# Patient Record
Sex: FEMALE | Race: WHITE | HISPANIC OR LATINO | Employment: PART TIME | URBAN - NONMETROPOLITAN AREA
[De-identification: names, ages, dates, MRNs, and addresses within clinical notes are randomized per-mention and may not be internally consistent; named-entity substitution may affect disease eponyms.]

---

## 2018-05-06 ENCOUNTER — HOSPITAL ENCOUNTER (EMERGENCY)
Facility: HOSPITAL | Age: 23
Discharge: HOME | End: 2018-05-07
Attending: STUDENT IN AN ORGANIZED HEALTH CARE EDUCATION/TRAINING PROGRAM | Admitting: STUDENT IN AN ORGANIZED HEALTH CARE EDUCATION/TRAINING PROGRAM

## 2018-05-06 DIAGNOSIS — R05.9 COUGH: Primary | ICD-10-CM

## 2018-05-06 PROCEDURE — 99283 EMERGENCY DEPT VISIT LOW MDM: CPT

## 2018-05-06 PROCEDURE — 94799 UNLISTED PULMONARY SVC/PX: CPT

## 2018-05-06 PROCEDURE — 94640 AIRWAY INHALATION TREATMENT: CPT

## 2018-05-06 PROCEDURE — 81025 URINE PREGNANCY TEST: CPT | Performed by: NURSE PRACTITIONER

## 2018-05-06 RX ORDER — ONDANSETRON 4 MG/1
4 TABLET, ORALLY DISINTEGRATING ORAL ONCE
Status: COMPLETED | OUTPATIENT
Start: 2018-05-06 | End: 2018-05-06

## 2018-05-06 RX ORDER — IPRATROPIUM BROMIDE AND ALBUTEROL SULFATE 2.5; .5 MG/3ML; MG/3ML
3 SOLUTION RESPIRATORY (INHALATION) ONCE
Status: COMPLETED | OUTPATIENT
Start: 2018-05-06 | End: 2018-05-06

## 2018-05-06 RX ADMIN — IPRATROPIUM BROMIDE AND ALBUTEROL SULFATE 3 ML: .5; 3 SOLUTION RESPIRATORY (INHALATION) at 23:16

## 2018-05-06 RX ADMIN — ONDANSETRON 4 MG: 4 TABLET, ORALLY DISINTEGRATING ORAL at 23:16

## 2018-05-07 VITALS
RESPIRATION RATE: 18 BRPM | DIASTOLIC BLOOD PRESSURE: 89 MMHG | BODY MASS INDEX: 33.99 KG/M2 | OXYGEN SATURATION: 98 % | HEART RATE: 110 BPM | HEIGHT: 61 IN | TEMPERATURE: 99.2 F | SYSTOLIC BLOOD PRESSURE: 137 MMHG | WEIGHT: 180 LBS

## 2018-05-07 LAB — B-HCG UR QL: NEGATIVE

## 2018-05-07 NOTE — ED PROVIDER NOTES
Subjective   History of Present Illness  This is a 22 year old female who comes in today  complaining of cough and nausea. She reports having a lot of allergies and went to [employer] and when she left she started having a lot of coughing and then coughed so much she had vomiting. She also would like to know if she is pregnant.  Review of Systems   Constitutional: Negative.    HENT: Negative.    Eyes: Negative.    Respiratory: Positive for cough.    Cardiovascular: Negative.    Gastrointestinal: Positive for nausea.   Endocrine: Negative.    Genitourinary: Negative.    Musculoskeletal: Negative.    Skin: Negative.    Allergic/Immunologic: Negative.    Neurological: Negative.    Hematological: Negative.    Psychiatric/Behavioral: Negative.    All other systems reviewed and are negative.      Past Medical History:   Diagnosis Date   • H/O seasonal allergies        No Known Allergies    History reviewed. No pertinent surgical history.    History reviewed. No pertinent family history.    Social History     Social History   • Marital status: Single     Social History Main Topics   • Smoking status: Never Smoker   • Alcohol use Yes      Comment: occasionally   • Drug use: No   • Sexual activity: Defer     Other Topics Concern   • Not on file           Objective   Physical Exam   Constitutional: She appears well-developed and well-nourished.   Nursing note and vitals reviewed.   GEN: No acute distress  Head: Normocephalic, atraumatic  Eyes: Pupils equal round reactive to light  ENT: Posterior pharynx normal in appearance, oral mucosa is moist  Chest: Nontender to palpation  Cardiovascular: Regular rate  Lungs: Scattered wheezes throughout  Abdomen: Soft, nontender, nondistended, no peritoneal signs  Extremities: No edema, normal appearance  Neuro: GCS 15  Psych: Mood and affect are appropriate      Procedures           ED Course  ED Course   Comment By Time   Feeling much better after her neb treatment.  We will continue to  monitor her for sure.  If she is doing okay we will discharge her home to follow up with her primary care. Edwige Martinezhead, APRN 05/07 0003                  MDM  Number of Diagnoses or Management Options  Diagnosis management comments: We will go ahead and give her a neb treatments that she did miss her allergy medicine today.  Her oxygen saturation is 97-99%.  And then we will reevaluate her.       Amount and/or Complexity of Data Reviewed  Clinical lab tests: ordered and reviewed  Review and summarize past medical records: yes  Discuss the patient with other providers: yes    Risk of Complications, Morbidity, and/or Mortality  Presenting problems: low  Diagnostic procedures: low  Management options: low          Final diagnoses:   Cough            Edwige L Maria Teresa, APRN  05/07/18 0028

## 2018-10-09 ENCOUNTER — TRANSCRIBE ORDERS (OUTPATIENT)
Dept: ADMINISTRATIVE | Facility: HOSPITAL | Age: 23
End: 2018-10-09

## 2018-10-09 ENCOUNTER — HOSPITAL ENCOUNTER (OUTPATIENT)
Dept: GENERAL RADIOLOGY | Facility: HOSPITAL | Age: 23
Discharge: HOME | End: 2018-10-09
Admitting: NURSE PRACTITIONER

## 2018-10-09 DIAGNOSIS — IMO0002 LUMP: ICD-10-CM

## 2018-10-09 DIAGNOSIS — M95.4 ACQUIRED DEFORMITY OF CHEST AND RIB: Primary | ICD-10-CM

## 2018-10-09 PROCEDURE — 71101 X-RAY EXAM UNILAT RIBS/CHEST: CPT

## 2019-05-31 ENCOUNTER — PROCEDURE VISIT (OUTPATIENT)
Dept: OBSTETRICS AND GYNECOLOGY | Facility: CLINIC | Age: 24
End: 2019-05-31

## 2019-05-31 VITALS
HEIGHT: 62 IN | BODY MASS INDEX: 32.2 KG/M2 | WEIGHT: 175 LBS | SYSTOLIC BLOOD PRESSURE: 112 MMHG | DIASTOLIC BLOOD PRESSURE: 80 MMHG

## 2019-05-31 DIAGNOSIS — Z30.017 ENCOUNTER FOR INITIAL PRESCRIPTION OF IMPLANTABLE SUBDERMAL CONTRACEPTIVE: ICD-10-CM

## 2019-05-31 DIAGNOSIS — Z12.4 SCREENING FOR MALIGNANT NEOPLASM OF CERVIX: ICD-10-CM

## 2019-05-31 DIAGNOSIS — Z01.419 ENCOUNTER FOR GYNECOLOGICAL EXAMINATION WITHOUT ABNORMAL FINDING: Primary | ICD-10-CM

## 2019-05-31 PROCEDURE — 99385 PREV VISIT NEW AGE 18-39: CPT | Performed by: PHYSICIAN ASSISTANT

## 2019-05-31 RX ORDER — NORGESTIMATE AND ETHINYL ESTRADIOL 7DAYSX3 28
KIT ORAL
COMMUNITY
Start: 2019-05-11 | End: 2020-08-27

## 2019-05-31 NOTE — PROGRESS NOTES
"Subjective   Chief Complaint   Patient presents with   • Gynecologic Exam     Has never had a pap   • Contraception     Would like to discuss birth control options       Chloe Stovall is a 23 y.o. year old new patient  presenting to be seen for her annual gynecological exam.   She has no complaints or concerns  Currently using Tri-sprintec oc's but desires more convenient birth control. Interested in Nexplanon   Cycles regular and Patient's last menstrual period was 05/10/2019.   She would like option of STI screening with pap      Past Medical History:   Diagnosis Date   • H/O seasonal allergies         Current Outpatient Medications:   •  Loratadine (CLARITIN PO), Take  by mouth., Disp: , Rfl:   •  Montelukast Sodium (SINGULAIR PO), Take  by mouth., Disp: , Rfl:   •  TRI-SPRINTEC 0.18/0.215/0.25 MG-35 MCG per tablet, , Disp: , Rfl:    No Known Allergies   Past Surgical History:   Procedure Laterality Date   • NO PAST SURGERIES        Social History     Socioeconomic History   • Marital status: Single     Spouse name: Not on file   • Number of children: Not on file   • Years of education: Not on file   • Highest education level: Not on file   Tobacco Use   • Smoking status: Never Smoker   • Smokeless tobacco: Never Used   Substance and Sexual Activity   • Alcohol use: Yes     Comment: occasionally   • Drug use: No   • Sexual activity: Yes     Partners: Male     Birth control/protection: OCP      Family History   Problem Relation Age of Onset   • No Known Problems Father    • No Known Problems Mother    • Breast cancer Maternal Aunt        Review of Systems   Constitutional: Negative.    Gastrointestinal: Negative.    Genitourinary: Negative.            Objective   /80   Ht 157.5 cm (62\")   Wt 79.4 kg (175 lb)   LMP 05/10/2019   Breastfeeding? No   BMI 32.01 kg/m²     Physical Exam   Constitutional: She appears well-developed and well-nourished. She is cooperative.   Pulmonary/Chest: Right breast " exhibits no inverted nipple, no mass, no nipple discharge, no skin change and no tenderness. Left breast exhibits no inverted nipple, no mass, no nipple discharge, no skin change and no tenderness.   Abdominal: Soft. Normal appearance. There is no tenderness.   Genitourinary: Vagina normal and uterus normal. There is no tenderness or lesion on the right labia. There is no tenderness or lesion on the left labia. Cervix exhibits no motion tenderness and no discharge. Right adnexum displays no mass and no tenderness. Left adnexum displays no mass and no tenderness.   Genitourinary Comments: Pap done   Neurological: She is alert.   Skin: Skin is warm and dry.   Psychiatric: She has a normal mood and affect. Her behavior is normal.            Assessment and Plan  Chloe was seen today for gynecologic exam and contraception.    Diagnoses and all orders for this visit:    Encounter for gynecological examination without abnormal finding    Screening for malignant neoplasm of cervix  -     Liquid-based Pap Smear, Screening; Future    Encounter for initial prescription of implantable subdermal contraceptive      Patient Instructions   Encourage self breast exam monthly  Regular exercise  STI prevention  Call office first day of menses to schedule Nexplanon insertion             This note was electronically signed.    Jessenia Sanchez PA-C   May 31, 2019

## 2019-05-31 NOTE — PATIENT INSTRUCTIONS
Encourage self breast exam monthly  Regular exercise  STI prevention  Call office first day of menses to schedule Nexplanon insertion

## 2019-06-11 DIAGNOSIS — Z12.4 SCREENING FOR MALIGNANT NEOPLASM OF CERVIX: ICD-10-CM

## 2019-06-13 ENCOUNTER — OFFICE VISIT (OUTPATIENT)
Dept: OBSTETRICS AND GYNECOLOGY | Facility: CLINIC | Age: 24
End: 2019-06-13

## 2019-06-13 VITALS
DIASTOLIC BLOOD PRESSURE: 78 MMHG | HEIGHT: 62 IN | BODY MASS INDEX: 32.2 KG/M2 | SYSTOLIC BLOOD PRESSURE: 118 MMHG | WEIGHT: 175 LBS

## 2019-06-13 DIAGNOSIS — Z30.017 NEXPLANON INSERTION: Primary | ICD-10-CM

## 2019-06-13 LAB
B-HCG UR QL: NEGATIVE
INTERNAL NEGATIVE CONTROL: NEGATIVE
INTERNAL POSITIVE CONTROL: POSITIVE
Lab: NORMAL

## 2019-06-13 PROCEDURE — 81025 URINE PREGNANCY TEST: CPT | Performed by: PHYSICIAN ASSISTANT

## 2019-06-13 PROCEDURE — 11981 INSERTION DRUG DLVR IMPLANT: CPT | Performed by: PHYSICIAN ASSISTANT

## 2019-06-13 NOTE — PROGRESS NOTES
Nexplanon Insertion    Patient's last menstrual period was 06/10/2019.    Date of procedure:  6/13/2019    Risks and benefits discussed? yes  All questions answered? yes  Consents given by the patient  Written consent obtained? yes    Local anesthesia used:  yes - 1.5 cc's of  Meds; anesthesia local: 1% lidocaine with epinephrine    Procedure documentation:    The upper left arm was marked at the intended site of insertion.  Betadine was used to cleanse the skin.  Local anesthesia was injected.  The Nexplanon was placed subdermally without difficulty.  The devise was able to be palpated in the arm by both myself and Shyana.  Steri-strips were then placed across the site of insertion and the arm was wrapped.    She tolerated the procedure well.  There were no complications.  EBL was minimal.    Post procedure instructions: Remove the wrapping in 24 hours and the steri-strips in 5 days.    Follow up needed: PRN    This note was electronically signed.    Jessenia Sanchez PA-C  June 13, 2019

## 2019-10-17 ENCOUNTER — HOSPITAL ENCOUNTER (EMERGENCY)
Facility: HOSPITAL | Age: 24
Discharge: HOME | End: 2019-10-17
Attending: STUDENT IN AN ORGANIZED HEALTH CARE EDUCATION/TRAINING PROGRAM | Admitting: STUDENT IN AN ORGANIZED HEALTH CARE EDUCATION/TRAINING PROGRAM

## 2019-10-17 VITALS
SYSTOLIC BLOOD PRESSURE: 136 MMHG | HEIGHT: 61 IN | WEIGHT: 181.6 LBS | TEMPERATURE: 97.8 F | DIASTOLIC BLOOD PRESSURE: 97 MMHG | HEART RATE: 82 BPM | BODY MASS INDEX: 34.29 KG/M2 | OXYGEN SATURATION: 100 % | RESPIRATION RATE: 18 BRPM

## 2019-10-17 DIAGNOSIS — L50.9 HIVES: Primary | ICD-10-CM

## 2019-10-17 PROCEDURE — 99283 EMERGENCY DEPT VISIT LOW MDM: CPT

## 2019-10-17 PROCEDURE — 25010000002 DEXAMETHASONE PER 1 MG: Performed by: STUDENT IN AN ORGANIZED HEALTH CARE EDUCATION/TRAINING PROGRAM

## 2019-10-17 RX ADMIN — DEXAMETHASONE SODIUM PHOSPHATE 10 MG: 10 INJECTION INTRAMUSCULAR; INTRAVENOUS at 03:55

## 2019-10-17 NOTE — ED PROVIDER NOTES
Subjective   Patient is a 23-year-old female who presents to the emergency department with concerns for hives.  Patient states she has had hives since approximately 2 days ago when heard her significant other were on vacation in [place] and a wine tasting.  She states they do seem to become worse at night but that she seems to be itching most of the time.  Patient was talking to her mother they agreed that an allergy referral would be the right thing to do.  States the rash is very itchy and that nothing makes it better or worse.  She has tried Benadryl.            Review of Systems   All other systems reviewed and are negative.      Past Medical History:   Diagnosis Date   • H/O seasonal allergies        No Known Allergies    Past Surgical History:   Procedure Laterality Date   • NO PAST SURGERIES         Family History   Problem Relation Age of Onset   • No Known Problems Father    • No Known Problems Mother    • Breast cancer Maternal Aunt        Social History     Socioeconomic History   • Marital status: Single     Spouse name: Not on file   • Number of children: Not on file   • Years of education: Not on file   • Highest education level: Not on file   Tobacco Use   • Smoking status: Never Smoker   • Smokeless tobacco: Never Used   Substance and Sexual Activity   • Alcohol use: Yes     Comment: occasionally   • Drug use: No   • Sexual activity: Yes     Partners: Male     Birth control/protection: Implant           Objective   Physical Exam   Nursing note and vitals reviewed.    GEN: No acute distress  Skin: Urticarial rash on bilateral upper extremities  Head: Normocephalic, atraumatic  Eyes: Pupils equal round reactive to light  ENT: Posterior pharynx normal in appearance, oral mucosa is moist  Chest: Nontender to palpation  Cardiovascular: Regular rate  Lungs: Clear to auscultation bilaterally  Abdomen: Soft, nontender, nondistended, no peritoneal signs  Extremities: No edema, normal appearance  Neuro: GCS  15  Psych: Mood and affect are appropriate      Procedures           ED Course                  MDM  Number of Diagnoses or Management Options  Hives:   Diagnosis management comments: Unsure what would be causing the patient's symptoms.  We will give her steroids here to help give her referral to an allergist.       Amount and/or Complexity of Data Reviewed  Decide to obtain previous medical records or to obtain history from someone other than the patient: yes  Obtain history from someone other than the patient: yes        Final diagnoses:   Hives              Jerrell Sandoval MD  10/17/19 0438

## 2020-07-21 ENCOUNTER — TELEPHONE (OUTPATIENT)
Dept: INTERNAL MEDICINE | Facility: CLINIC | Age: 25
End: 2020-07-21

## 2020-07-21 NOTE — TELEPHONE ENCOUNTER
Patient is calling to establish care and has had a cough and covid testing which came back inconclusive.  Patient is having another test done at urgent care. Patient has MYCHART video visit on 07/24/20  At 1045 with Maryann Couch to discuss anxiety and establish primary care.  Please advise    If other scheduling needs are requested, patient contact is   [phone]

## 2020-08-27 ENCOUNTER — OFFICE VISIT (OUTPATIENT)
Dept: OBSTETRICS AND GYNECOLOGY | Facility: CLINIC | Age: 25
End: 2020-08-27

## 2020-08-27 VITALS
WEIGHT: 178.4 LBS | BODY MASS INDEX: 33.68 KG/M2 | SYSTOLIC BLOOD PRESSURE: 116 MMHG | HEIGHT: 61 IN | DIASTOLIC BLOOD PRESSURE: 74 MMHG

## 2020-08-27 DIAGNOSIS — Z01.419 ENCOUNTER FOR GYNECOLOGICAL EXAMINATION WITHOUT ABNORMAL FINDING: Primary | ICD-10-CM

## 2020-08-27 DIAGNOSIS — Z12.4 SCREENING FOR CERVICAL CANCER: ICD-10-CM

## 2020-08-27 PROCEDURE — 99395 PREV VISIT EST AGE 18-39: CPT | Performed by: PHYSICIAN ASSISTANT

## 2020-08-27 NOTE — PROGRESS NOTES
"Subjective   Chief Complaint   Patient presents with   • Gynecologic Exam     Last pap done 6/3/19-WNL, No complaints       Chloe Stovall is a 24 y.o. year old  presenting to be seen for her annual gynecological exam.   She has no complaints or concerns.  She has Nexplanon for birth control which was inserted 2019.  She has very light random bleeds with Nexplanon.  She is engaged and declines screening for STIs.    Past Medical History:   Diagnosis Date   • H/O seasonal allergies         Current Outpatient Medications:   •  Loratadine (CLARITIN PO), Take  by mouth., Disp: , Rfl:   •  Montelukast Sodium (SINGULAIR PO), Take  by mouth., Disp: , Rfl:    No Known Allergies   Past Surgical History:   Procedure Laterality Date   • NO PAST SURGERIES        Social History     Socioeconomic History   • Marital status: Single     Spouse name: Not on file   • Number of children: Not on file   • Years of education: Not on file   • Highest education level: Not on file   Tobacco Use   • Smoking status: Never Smoker   • Smokeless tobacco: Never Used   Substance and Sexual Activity   • Alcohol use: Yes     Comment: occasionally   • Drug use: No   • Sexual activity: Yes     Partners: Male     Birth control/protection: Implant      Family History   Problem Relation Age of Onset   • No Known Problems Father    • No Known Problems Mother    • Breast cancer Maternal Aunt        Review of Systems   Constitutional: Negative for chills, diaphoresis and fever.   Gastrointestinal: Negative for diarrhea, nausea and vomiting.   Genitourinary: Negative for difficulty urinating, dysuria, menstrual problem, pelvic pain and vaginal discharge.   Musculoskeletal: Negative.    All other systems reviewed and are negative.          Objective   /74   Ht 154.9 cm (61\")   Wt 80.9 kg (178 lb 6.4 oz)   Breastfeeding No   BMI 33.71 kg/m²     Physical Exam   Constitutional: She appears well-developed and well-nourished. She is " cooperative. No distress.   Eyes: Conjunctivae, EOM and lids are normal.   Pulmonary/Chest: Right breast exhibits no inverted nipple, no mass, no nipple discharge, no skin change and no tenderness. Left breast exhibits no inverted nipple, no mass, no nipple discharge, no skin change and no tenderness.   Abdominal: Soft. Normal appearance. There is no tenderness. There is no rigidity and no guarding.   Genitourinary: Uterus normal. There is no tenderness or lesion on the right labia. There is no tenderness or lesion on the left labia. Cervix exhibits no motion tenderness, no discharge and no friability. Right adnexum displays no mass and no tenderness. Left adnexum displays no mass and no tenderness. No erythema or tenderness in the vagina. No vaginal discharge found.   Genitourinary Comments: Pap done   Musculoskeletal: Normal range of motion.   Neurological: She is alert.   Skin: Skin is warm and dry. No lesion and no rash noted.   Psychiatric: She has a normal mood and affect. Her behavior is normal. Thought content normal.            Assessment and Plan  Chloe was seen today for gynecologic exam.    Diagnoses and all orders for this visit:    Encounter for gynecological examination without abnormal finding    Screening for cervical cancer  -     Liquid-based Pap Smear, Screening; Future      Patient Instructions   Encourage self breast exam monthly  Regular exercise             This note was electronically signed.    Jessenia Sanchez PA-C   August 27, 2020

## 2020-09-08 DIAGNOSIS — Z12.4 SCREENING FOR CERVICAL CANCER: ICD-10-CM

## 2020-09-10 ENCOUNTER — OFFICE VISIT (OUTPATIENT)
Dept: INTERNAL MEDICINE | Facility: CLINIC | Age: 25
End: 2020-09-10

## 2020-09-10 VITALS
TEMPERATURE: 97.1 F | WEIGHT: 178 LBS | OXYGEN SATURATION: 99 % | DIASTOLIC BLOOD PRESSURE: 70 MMHG | HEIGHT: 61 IN | BODY MASS INDEX: 33.61 KG/M2 | SYSTOLIC BLOOD PRESSURE: 118 MMHG | HEART RATE: 79 BPM

## 2020-09-10 DIAGNOSIS — F41.1 GENERALIZED ANXIETY DISORDER: ICD-10-CM

## 2020-09-10 DIAGNOSIS — Z23 NEED FOR INFLUENZA VACCINATION: ICD-10-CM

## 2020-09-10 DIAGNOSIS — Z91.09 ENVIRONMENTAL ALLERGIES: ICD-10-CM

## 2020-09-10 DIAGNOSIS — Z76.89 ENCOUNTER TO ESTABLISH CARE: Primary | ICD-10-CM

## 2020-09-10 DIAGNOSIS — F32.A MILD DEPRESSIVE DISORDER: ICD-10-CM

## 2020-09-10 PROCEDURE — 90471 IMMUNIZATION ADMIN: CPT | Performed by: PHYSICIAN ASSISTANT

## 2020-09-10 PROCEDURE — 99214 OFFICE O/P EST MOD 30 MIN: CPT | Performed by: PHYSICIAN ASSISTANT

## 2020-09-10 PROCEDURE — 90686 IIV4 VACC NO PRSV 0.5 ML IM: CPT | Performed by: PHYSICIAN ASSISTANT

## 2020-09-10 RX ORDER — MONTELUKAST SODIUM 10 MG/1
10 TABLET ORAL NIGHTLY
Qty: 30 TABLET | Refills: 11 | Status: SHIPPED | OUTPATIENT
Start: 2020-09-10 | End: 2021-10-05

## 2020-09-10 RX ORDER — HYDROXYZINE HYDROCHLORIDE 25 MG/1
25 TABLET, FILM COATED ORAL 3 TIMES DAILY PRN
COMMUNITY
End: 2020-09-10 | Stop reason: ALTCHOICE

## 2020-09-10 RX ORDER — BUPROPION HYDROCHLORIDE 300 MG/1
300 TABLET ORAL DAILY
Qty: 30 TABLET | Refills: 5 | Status: SHIPPED | OUTPATIENT
Start: 2020-09-10 | End: 2021-03-12 | Stop reason: SDUPTHER

## 2020-09-10 RX ORDER — BUPROPION HYDROCHLORIDE 100 MG/1
TABLET ORAL
COMMUNITY
Start: 2020-06-01 | End: 2020-09-10 | Stop reason: DRUGHIGH

## 2020-09-10 RX ORDER — BUSPIRONE HYDROCHLORIDE 10 MG/1
10 TABLET ORAL 3 TIMES DAILY PRN
Qty: 90 TABLET | Refills: 5 | Status: SHIPPED | OUTPATIENT
Start: 2020-09-10

## 2020-09-10 RX ORDER — LORATADINE 10 MG/1
10 TABLET ORAL DAILY
Qty: 30 TABLET | Refills: 11 | Status: SHIPPED | OUTPATIENT
Start: 2020-09-10 | End: 2021-10-05

## 2020-09-10 NOTE — PROGRESS NOTES
Subjective   Chloe Stovall is a 24 y.o. female who presents to [facility].    Chief Complaint   Patient presents with   • [facility]     discuss anxiety med, stomach issue       HPI: Diagnosed with mild depressive disorder and severe anxiety by her former PCP at [facility]. She has been taking Wellbutrin 150 mg once daily for the last few months which helps some but not enough.  She rarely takes hydroxyzine as cannot take it during the day due to side effect of drowsiness. No SI or HI. Sleep disturbances improved with Wellbutrin.     She previously experienced a few months of diarrhea and lower abdominal pain around 30 minutes after eating anything.  Symptoms seem to resolve with use of daily probiotics.  She now denies any diarrhea, abdominal pain, constipation, melena, BRBPR, nausea or vomiting.         The following portions of the patient's history were reviewed and updated as appropriate: She  has a past medical history of Depression and H/O seasonal allergies.  She does not have any pertinent problems on file.  She  has a past surgical history that includes Tonsillectomy (Bilateral, 11/2019).  Her family history includes Breast cancer in her maternal aunt; No Known Problems in her father and mother.  She  reports that she has never smoked. She has never used smokeless tobacco. She reports that she drinks alcohol. She reports that she does not use drugs.    Current Outpatient Medications   Medication Sig Dispense Refill   • Etonogestrel (Nexplanon) 68 MG implant subdermal implant      • Lactobacillus (PROBIOTIC ACIDOPHILUS PO) Take  by mouth.     • buPROPion XL (WELLBUTRIN XL) 300 MG 24 hr tablet Take 1 tablet by mouth Daily. 30 tablet 5   • busPIRone (BUSPAR) 10 MG tablet Take 1 tablet by mouth 3 (Three) Times a Day As Needed (anxiety). 90 tablet 5   • loratadine (Claritin) 10 MG tablet Take 1 tablet by mouth Daily. 30 tablet 11   • montelukast (Singulair) 10 MG tablet Take 1 tablet by  mouth Every Night. 30 tablet 11     No current facility-administered medications for this visit.        Review of Systems  Review of Systems   Constitutional: Negative for activity change, appetite change, chills, diaphoresis, fatigue, fever, unexpected weight gain and unexpected weight loss.   HENT: Negative for congestion, dental problem, ear pain, mouth sores, postnasal drip, rhinorrhea, sinus pressure, sneezing, sore throat, swollen glands, trouble swallowing and voice change.    Eyes: Negative for blurred vision, double vision, pain, discharge, redness, itching and visual disturbance.   Respiratory: Negative for cough, choking, chest tightness, shortness of breath and wheezing.    Cardiovascular: Negative for chest pain, palpitations and leg swelling.   Gastrointestinal: Positive for abdominal pain (resolved) and diarrhea (resolved). Negative for abdominal distention, blood in stool, constipation, nausea, rectal pain, vomiting, GERD and indigestion.   Endocrine: Negative for cold intolerance, heat intolerance, polydipsia, polyphagia and polyuria.   Genitourinary: Negative for breast discharge, breast lump, breast pain, decreased libido, decreased urine volume, difficulty urinating, dysuria, flank pain, frequency, hematuria, pelvic pain, urgency, vaginal bleeding and vaginal pain.   Musculoskeletal: Negative for arthralgias, back pain, gait problem, myalgias and neck pain.   Skin: Negative for color change, rash and skin lesions.   Allergic/Immunologic: Positive for environmental allergies. Negative for immunocompromised state.   Neurological: Negative for dizziness, tremors, facial asymmetry, speech difficulty, weakness, light-headedness, numbness, headache, memory problem and confusion.   Hematological: Negative for adenopathy. Does not bruise/bleed easily.   Psychiatric/Behavioral: Positive for dysphoric mood (stable) and sleep disturbance (improved). Negative for agitation, behavioral problems, decreased  "concentration, hallucinations, self-injury, suicidal ideas, negative for hyperactivity, depressed mood and stress. The patient is nervous/anxious.          Objective    /70   Pulse 79   Temp 97.1 °F (36.2 °C)   Ht 154.9 cm (61\")   Wt 80.7 kg (178 lb)   SpO2 99%   BMI 33.63 kg/m²   Physical Exam   Constitutional: She is oriented to person, place, and time. She appears well-developed and well-nourished. No distress.   Mild obesity.    HENT:   Head: Normocephalic and atraumatic.   Eyes: Pupils are equal, round, and reactive to light. Conjunctivae and EOM are normal.   Neck: Normal range of motion. Neck supple. No thyromegaly present.   Cardiovascular: Normal rate, regular rhythm and normal heart sounds. Exam reveals no gallop and no friction rub.   No murmur heard.  Pulmonary/Chest: Effort normal and breath sounds normal. No respiratory distress. She has no wheezes. She has no rales. She exhibits no tenderness.   Abdominal: Soft. Bowel sounds are normal. She exhibits no distension and no mass. There is no tenderness. There is no rebound and no guarding. No hernia.   Musculoskeletal: Normal range of motion. She exhibits no edema, tenderness or deformity.   Lymphadenopathy:     She has no cervical adenopathy.   Neurological: She is alert and oriented to person, place, and time. She displays normal reflexes. No cranial nerve deficit or sensory deficit. She exhibits normal muscle tone. Coordination normal.   Skin: Skin is warm and dry. Capillary refill takes less than 2 seconds. No rash noted. She is not diaphoretic.   Psychiatric: She has a normal mood and affect. Her behavior is normal. Judgment and thought content normal.   Nursing note and vitals reviewed.           PHQ-9 Depression Screening  Little interest or pleasure in doing things? 0   Feeling down, depressed, or hopeless? 0   Trouble falling or staying asleep, or sleeping too much?     Feeling tired or having little energy?     Poor appetite or " overeating?     Feeling bad about yourself - or that you are a failure or have let yourself or your family down?     Trouble concentrating on things, such as reading the newspaper or watching television?     Moving or speaking so slowly that other people could have noticed? Or the opposite - being so fidgety or restless that you have been moving around a lot more than usual?     Thoughts that you would be better off dead, or of hurting yourself in some way?     PHQ-9 Total Score 0   If you checked off any problems, how difficult have these problems made it for you to do your work, take care of things at home, or get along with other people?           Assessment/Plan      Diagnosis Plan   1. Encounter to establish care     2. Need for influenza vaccination  Fluarix Quad >6 Months ([id number])     3. Mild depressive disorder (CMS/HCC)  Dose increase: buPROPion XL (WELLBUTRIN XL) 300 MG 24 hr tablet   4. Generalized anxiety disorder  Dose increase: buPROPion XL (WELLBUTRIN XL) 300 MG 24 hr tablet      Begin tid prn: busPIRone (BUSPAR) 10 MG tablet    Risks, benefits and potential side effects of medication reviewed and patient agrees to use.      5. Environmental allergies  Continue: loratadine (Claritin) 10 MG tablet    Continue: montelukast (Singulair) 10 MG tablet           Return in about 6 months (around 3/10/2021) for Annual physical.             ROLANDO Pizarro  09/10/2020  9:20 AM

## 2020-10-26 ENCOUNTER — TELEPHONE (OUTPATIENT)
Dept: OBSTETRICS AND GYNECOLOGY | Facility: CLINIC | Age: 25
End: 2020-10-26

## 2020-10-26 RX ORDER — NORETHINDRONE ACETATE AND ETHINYL ESTRADIOL 1MG-20(21)
1 KIT ORAL DAILY
Qty: 28 TABLET | Refills: 2 | Status: SHIPPED | OUTPATIENT
Start: 2020-10-26 | End: 2021-03-12

## 2020-10-26 NOTE — TELEPHONE ENCOUNTER
----- Message from Chloe Stovall sent at 10/23/2020 10:45 PM EDT -----  Regarding: Non-Urgent Medical Question  Contact: [phone]  So I have had Nexplanon for over a year now and I had two times where I have had some spotting, however since my last dr visit with you in September, I have had a period off and on. Like it lasts over a week and then I have a few days where there no bleeding, then it repeats. I'm on my third round of periods now and I'm just wondering if this is something I need to be concerned about. I know nexplanon does that to women's periods and then they eventually don't have them anymore. But mine is doing the complete opposite of that.   Hope to hear back soon.   Thanks,   Chloe Stovall   [phone]

## 2020-10-26 NOTE — TELEPHONE ENCOUNTER
Flori,    I called and spoke with patient, discussed maybe starting on a birth control pill to help with bleeding.

## 2020-10-26 NOTE — TELEPHONE ENCOUNTER
If patient is not having any other problems such as pelvic pain, discharge, symptoms of vaginal infection then she may try junel fe 1/20 ocp for 3 months. Must take ocp consistently daily

## 2020-10-26 NOTE — TELEPHONE ENCOUNTER
Pt is not having any syptoms of  Vaginal infection, pelvic pain.     Rx is pending for you, just need to be approved

## 2021-03-12 ENCOUNTER — OFFICE VISIT (OUTPATIENT)
Dept: INTERNAL MEDICINE | Facility: CLINIC | Age: 26
End: 2021-03-12

## 2021-03-12 VITALS
BODY MASS INDEX: 33.23 KG/M2 | WEIGHT: 176 LBS | DIASTOLIC BLOOD PRESSURE: 68 MMHG | SYSTOLIC BLOOD PRESSURE: 122 MMHG | TEMPERATURE: 97.4 F | HEIGHT: 61 IN | OXYGEN SATURATION: 100 % | HEART RATE: 93 BPM

## 2021-03-12 DIAGNOSIS — Z00.00 ANNUAL PHYSICAL EXAM: Primary | ICD-10-CM

## 2021-03-12 DIAGNOSIS — E66.09 CLASS 1 OBESITY DUE TO EXCESS CALORIES WITHOUT SERIOUS COMORBIDITY WITH BODY MASS INDEX (BMI) OF 33.0 TO 33.9 IN ADULT: ICD-10-CM

## 2021-03-12 DIAGNOSIS — F41.1 GENERALIZED ANXIETY DISORDER: ICD-10-CM

## 2021-03-12 DIAGNOSIS — F32.A MILD DEPRESSIVE DISORDER: ICD-10-CM

## 2021-03-12 PROCEDURE — 99395 PREV VISIT EST AGE 18-39: CPT | Performed by: PHYSICIAN ASSISTANT

## 2021-03-12 RX ORDER — BUPROPION HYDROCHLORIDE 300 MG/1
300 TABLET ORAL DAILY
Qty: 90 TABLET | Refills: 3 | Status: SHIPPED | OUTPATIENT
Start: 2021-03-12 | End: 2022-03-05

## 2021-03-12 NOTE — PROGRESS NOTES
Subjective   Chloe Stovall is a 25 y.o. female and is here for a comprehensive physical exam. The patient reports no new problems.    HPI: she has been doing well since Wellbutrin dose increase. She rarely needs to take Buspar.       PHQ-2 Depression Screening  Little interest or pleasure in doing things? 0   Feeling down, depressed, or hopeless? 0   PHQ-2 Total Score 0         Health Habits:  Eye exam within last 2 years? Yes   Dental exam every 6 months? Yes   Exercise habits: was exercising but not in recent months  Healthy diet? Yes     The ASCVD Risk score (Lorena WILLS Jr., et al., 2013) failed to calculate for the following reasons:    The 2013 ASCVD risk score is only valid for ages 40 to 79        Do you take any herbs or supplements that were not prescribed by a doctor? yes , probiotic  Are you taking calcium supplements? No  Are you taking aspirin daily? No     History:  LMP: No LMP recorded. Patient has had an implant.  Menopause: No  Last pap date: 20  Abnormal pap? no  Family history of breast or ovarian cancer: yes, maternal aunt         OB History    Para Term  AB Living   0 0 0 0 0 0   SAB TAB Ectopic Molar Multiple Live Births   0 0 0 0 0 0      reports being sexually active and has had partner(s) who are Male. She reports using the following method of birth control/protection: Implant.        The following portions of the patient's history were reviewed and updated as appropriate: She  has a past medical history of Depression and H/O seasonal allergies.  She does not have any pertinent problems on file.  She  has a past surgical history that includes Tonsillectomy (Bilateral, 2019).  Her family history includes Breast cancer in her maternal aunt; No Known Problems in her father and mother.  She  reports that she has never smoked. She has never used smokeless tobacco. She reports current alcohol use. She reports that she does not use drugs.  Current Outpatient Medications    Medication Sig Dispense Refill   • buPROPion XL (WELLBUTRIN XL) 300 MG 24 hr tablet Take 1 tablet by mouth Daily. 90 tablet 3   • busPIRone (BUSPAR) 10 MG tablet Take 1 tablet by mouth 3 (Three) Times a Day As Needed (anxiety). 90 tablet 5   • Etonogestrel (Nexplanon) 68 MG implant subdermal implant      • Lactobacillus (PROBIOTIC ACIDOPHILUS PO) Take  by mouth.     • loratadine (Claritin) 10 MG tablet Take 1 tablet by mouth Daily. 30 tablet 11   • montelukast (Singulair) 10 MG tablet Take 1 tablet by mouth Every Night. 30 tablet 11     No current facility-administered medications for this visit.       Review of Systems  Do you have pain that bothers you in your daily life? no    Review of Systems   Constitutional: Negative for activity change, appetite change, chills, diaphoresis, fatigue, fever, unexpected weight gain and unexpected weight loss.   HENT: Negative for congestion, dental problem, ear pain, mouth sores, postnasal drip, rhinorrhea, sinus pressure, sneezing, sore throat, swollen glands, trouble swallowing and voice change.    Eyes: Negative for blurred vision, double vision, pain, discharge, redness, itching and visual disturbance.   Respiratory: Negative for cough, choking, chest tightness, shortness of breath and wheezing.    Cardiovascular: Negative for chest pain, palpitations and leg swelling.   Gastrointestinal: Negative for abdominal distention, abdominal pain, blood in stool, constipation, diarrhea, nausea, rectal pain, vomiting, GERD and indigestion.   Endocrine: Negative for cold intolerance, heat intolerance, polydipsia, polyphagia and polyuria.   Genitourinary: Negative for breast discharge, breast lump, breast pain, decreased libido, decreased urine volume, difficulty urinating, dysuria, flank pain, frequency, hematuria, pelvic pain, urgency, vaginal bleeding and vaginal pain.   Musculoskeletal: Positive for back pain (minimal). Negative for arthralgias, gait problem, myalgias and neck  "pain.   Skin: Negative for color change, rash and skin lesions.   Allergic/Immunologic: Positive for environmental allergies (stable). Negative for immunocompromised state.   Neurological: Negative for dizziness, tremors, facial asymmetry, speech difficulty, weakness, light-headedness, numbness, headache, memory problem and confusion.   Hematological: Negative for adenopathy. Does not bruise/bleed easily.   Psychiatric/Behavioral: Positive for depressed mood (stable). Negative for agitation, behavioral problems, decreased concentration, hallucinations, self-injury, sleep disturbance, suicidal ideas, negative for hyperactivity and stress. The patient is nervous/anxious (stable).          Objective   /68   Pulse 93   Temp 97.4 °F (36.3 °C)   Ht 154.9 cm (60.98\")   Wt 79.8 kg (176 lb)   SpO2 100%   BMI 33.27 kg/m²     Physical Exam  Vitals and nursing note reviewed.   Constitutional:       General: She is not in acute distress.     Appearance: She is well-developed. She is obese. She is not ill-appearing, toxic-appearing or diaphoretic.   HENT:      Head: Normocephalic and atraumatic.      Right Ear: Ear canal and external ear normal. There is no impacted cerumen.      Left Ear: Ear canal and external ear normal. There is no impacted cerumen.      Ears:      Comments: Mild bilateral TM effusions.  Eyes:      General: No scleral icterus.        Right eye: No discharge.         Left eye: No discharge.      Extraocular Movements: Extraocular movements intact.      Conjunctiva/sclera: Conjunctivae normal.      Pupils: Pupils are equal, round, and reactive to light.   Neck:      Thyroid: No thyromegaly.   Cardiovascular:      Rate and Rhythm: Normal rate and regular rhythm.      Heart sounds: Normal heart sounds. No murmur. No friction rub. No gallop.    Pulmonary:      Effort: Pulmonary effort is normal. No respiratory distress.      Breath sounds: Normal breath sounds. No wheezing or rales.   Chest:      Chest " wall: No tenderness.   Abdominal:      General: Bowel sounds are normal. There is no distension.      Palpations: Abdomen is soft. There is no mass.      Tenderness: There is no abdominal tenderness. There is no right CVA tenderness, left CVA tenderness or rebound.   Musculoskeletal:         General: No tenderness or deformity. Normal range of motion.      Cervical back: Normal range of motion and neck supple. No rigidity or tenderness.      Right lower leg: No edema.      Left lower leg: No edema.   Lymphadenopathy:      Cervical: No cervical adenopathy.   Skin:     General: Skin is warm and dry.      Capillary Refill: Capillary refill takes less than 2 seconds.      Coloration: Skin is not pale.      Findings: No rash.   Neurological:      Mental Status: She is alert and oriented to person, place, and time.      Cranial Nerves: No cranial nerve deficit.      Sensory: No sensory deficit.      Gait: Gait normal.      Deep Tendon Reflexes: Reflexes normal.   Psychiatric:         Mood and Affect: Mood normal.         Behavior: Behavior normal.         Thought Content: Thought content normal.         Judgment: Judgment normal.            Assessment/Plan   Healthy female exam.     1.    Diagnosis Plan   1. Annual physical exam     2. Class 1 obesity due to excess calories without serious comorbidity with body mass index (BMI) of 33.0 to 33.9 in adult  Patient's Body mass index is 33.27 kg/m². BMI is above normal parameters. Recommendations include: exercise counseling and nutrition counseling.     3. Mild depressive disorder (CMS/HCC)  Well controlled, continue: buPROPion XL (WELLBUTRIN XL) 300 MG 24 hr tablet   4. Generalized anxiety disorder  Well controlled, continue: buPROPion XL (WELLBUTRIN XL) 300 MG 24 hr tablet    Continue Buspar 10 mg tid prn.         2. Patient Counseling:  --Nutrition: Stressed importance of moderation in sodium/caffeine intake, saturated fat and cholesterol, caloric balance, sufficient  intake of fresh fruits, vegetables, fiber, calcium, iron, and 1 g folate supplementation if of childbearing age.   --Discussed the issue of calcium supplement, and the daily use of baby aspirin if applicable.             --Mammogram recommended every 2 years from age 40-49 and yearly beginning at age 50.  --Exercise: Stressed the importance of regular exercise.   --Substance Abuse: Discussed cessation/primary prevention of tobacco (if applicable), alcohol, or other drug use (if applicable); driving or other dangerous activities under the influence; availability of treatment for abuse.    --Sexuality: Discussed sexually transmitted diseases, partner selection, use of condoms, avoidance of unintended pregnancy  and contraceptive alternatives.   --Injury prevention: Discussed safety belts, safety helmets, smoke detector, smoking near bedding or upholstery.   --Dental health: Discussed importance of regular tooth brushing, flossing, and dental visits every 6 months.  --Immunizations reviewed.  --Discussed benefits of screening colonoscopy (if applicable).  --After hours service discussed with patient.    3. Discussed the patient's BMI with her.  The BMI is above average; BMI management plan is completed  4. Return in about 1 year (around 3/12/2022) for Annual physical.         ROLANDO Pizarro  03/12/2021  09:05 EST

## 2021-03-30 ENCOUNTER — IMMUNIZATION (OUTPATIENT)
Dept: VACCINE CLINIC | Facility: HOSPITAL | Age: 26
End: 2021-03-30

## 2021-03-30 PROCEDURE — 91300 HC SARSCOV02 VAC 30MCG/0.3ML IM: CPT | Performed by: INTERNAL MEDICINE

## 2021-03-30 PROCEDURE — 0001A: CPT | Performed by: INTERNAL MEDICINE

## 2021-04-21 ENCOUNTER — IMMUNIZATION (OUTPATIENT)
Dept: VACCINE CLINIC | Facility: HOSPITAL | Age: 26
End: 2021-04-21

## 2021-04-21 PROCEDURE — 91300 HC SARSCOV02 VAC 30MCG/0.3ML IM: CPT | Performed by: INTERNAL MEDICINE

## 2021-04-21 PROCEDURE — 0002A: CPT | Performed by: INTERNAL MEDICINE

## 2021-09-09 ENCOUNTER — OFFICE VISIT (OUTPATIENT)
Dept: OBSTETRICS AND GYNECOLOGY | Facility: CLINIC | Age: 26
End: 2021-09-09

## 2021-09-09 VITALS
WEIGHT: 179.6 LBS | BODY MASS INDEX: 35.26 KG/M2 | DIASTOLIC BLOOD PRESSURE: 62 MMHG | SYSTOLIC BLOOD PRESSURE: 100 MMHG | HEIGHT: 60 IN

## 2021-09-09 DIAGNOSIS — Z01.419 ENCOUNTER FOR GYNECOLOGICAL EXAMINATION WITHOUT ABNORMAL FINDING: Primary | ICD-10-CM

## 2021-09-09 DIAGNOSIS — Z11.3 ROUTINE SCREENING FOR STI (SEXUALLY TRANSMITTED INFECTION): ICD-10-CM

## 2021-09-09 DIAGNOSIS — Z12.4 SCREENING FOR CERVICAL CANCER: ICD-10-CM

## 2021-09-09 PROCEDURE — 99395 PREV VISIT EST AGE 18-39: CPT | Performed by: PHYSICIAN ASSISTANT

## 2021-09-09 NOTE — PROGRESS NOTES
Subjective   Chief Complaint   Patient presents with   • Gynecologic Exam     annual, no complaints       Chloe Stovall is a 25 y.o. year old  presenting to be seen for her annual gynecological exam.   She has no complaints or concerns.  She has Nexplanon for birth control and has had some random light bleeding with Nexplanon but has not had any bleeding for about the past month.  She would like Chlamydia gonorrhea screening with her Pap smear today.  Sexually active and monogamous with male partner.    Past Medical History:   Diagnosis Date   • Depression    • H/O seasonal allergies         Current Outpatient Medications:   •  buPROPion XL (WELLBUTRIN XL) 300 MG 24 hr tablet, Take 1 tablet by mouth Daily., Disp: 90 tablet, Rfl: 3  •  busPIRone (BUSPAR) 10 MG tablet, Take 1 tablet by mouth 3 (Three) Times a Day As Needed (anxiety)., Disp: 90 tablet, Rfl: 5  •  Etonogestrel (Nexplanon) 68 MG implant subdermal implant, , Disp: , Rfl:   •  Lactobacillus (PROBIOTIC ACIDOPHILUS PO), Take  by mouth., Disp: , Rfl:   •  loratadine (Claritin) 10 MG tablet, Take 1 tablet by mouth Daily., Disp: 30 tablet, Rfl: 11  •  montelukast (Singulair) 10 MG tablet, Take 1 tablet by mouth Every Night., Disp: 30 tablet, Rfl: 11   No Known Allergies   Past Surgical History:   Procedure Laterality Date   • TONSILLECTOMY Bilateral 2019      Social History     Socioeconomic History   • Marital status: Single     Spouse name: Not on file   • Number of children: Not on file   • Years of education: Not on file   • Highest education level: Not on file   Tobacco Use   • Smoking status: Never Smoker   • Smokeless tobacco: Never Used   Substance and Sexual Activity   • Alcohol use: Yes     Comment: occasionally 1-2 drinks   • Drug use: No   • Sexual activity: Yes     Partners: Male     Birth control/protection: Implant      Family History   Problem Relation Age of Onset   • No Known Problems Father    • No Known Problems Mother    •  "Breast cancer Maternal Aunt        Review of Systems   Constitutional: Negative for chills, diaphoresis and fever.   Gastrointestinal: Negative.    Genitourinary: Negative for difficulty urinating, dysuria, menstrual problem, pelvic pain and vaginal discharge.   All other systems reviewed and are negative.          Objective   /62   Ht 152.4 cm (60\")   Wt 81.5 kg (179 lb 9.6 oz)   LMP 07/08/2021 (Approximate)   BMI 35.08 kg/m²     Physical Exam  Constitutional:       Appearance: Normal appearance. She is well-developed and well-groomed.   Eyes:      General: Lids are normal.      Extraocular Movements: Extraocular movements intact.      Conjunctiva/sclera: Conjunctivae normal.   Neck:      Thyroid: No thyroid mass or thyromegaly.   Chest:      Breasts: Breasts are symmetrical.         Right: No inverted nipple, mass, nipple discharge, skin change or tenderness.         Left: No inverted nipple, mass, nipple discharge, skin change or tenderness.   Abdominal:      General: There is no distension.      Palpations: Abdomen is soft. There is no hepatomegaly or splenomegaly.      Tenderness: There is no abdominal tenderness.   Genitourinary:     Exam position: Lithotomy position.      Labia:         Right: No rash, tenderness or lesion.         Left: No rash, tenderness or lesion.       Urethra: No prolapse, urethral pain, urethral swelling or urethral lesion.      Vagina: No vaginal discharge, tenderness or lesions.      Cervix: No cervical motion tenderness, discharge, friability or lesion.      Uterus: Not enlarged and not tender.       Adnexa:         Right: No mass or tenderness.          Left: No mass or tenderness.     Musculoskeletal:      Cervical back: Neck supple.   Lymphadenopathy:      Upper Body:      Right upper body: No axillary adenopathy.      Left upper body: No axillary adenopathy.   Skin:     General: Skin is warm and dry.      Findings: No lesion.   Neurological:      General: No focal " deficit present.      Mental Status: She is alert and oriented to person, place, and time.   Psychiatric:         Attention and Perception: Attention normal.         Mood and Affect: Mood normal.         Speech: Speech normal.         Behavior: Behavior normal.         Thought Content: Thought content normal.            Result Review :                   Assessment and Plan  Diagnoses and all orders for this visit:    1. Encounter for gynecological examination without abnormal finding (Primary)    2. Screening for cervical cancer  -     Pap IG, Ct-Ng, Rfx HPV ASCU; Future    3. Routine screening for STI (sexually transmitted infection)  -     Pap IG, Ct-Ng, Rfx HPV ASCU; Future      Patient Instructions   Self breast exam monthly  Regular exercise                This note was electronically signed.    Jessenia Sanchez PA-C   September 9, 2021

## 2021-09-29 DIAGNOSIS — Z11.3 ROUTINE SCREENING FOR STI (SEXUALLY TRANSMITTED INFECTION): ICD-10-CM

## 2021-09-29 DIAGNOSIS — Z12.4 SCREENING FOR CERVICAL CANCER: ICD-10-CM

## 2021-10-05 DIAGNOSIS — Z91.09 ENVIRONMENTAL ALLERGIES: ICD-10-CM

## 2021-10-05 RX ORDER — LORATADINE 10 MG/1
TABLET ORAL
Qty: 30 TABLET | Refills: 11 | Status: SHIPPED | OUTPATIENT
Start: 2021-10-05 | End: 2022-10-27 | Stop reason: SDUPTHER

## 2021-10-05 RX ORDER — MONTELUKAST SODIUM 10 MG/1
TABLET ORAL
Qty: 30 TABLET | Refills: 11 | Status: SHIPPED | OUTPATIENT
Start: 2021-10-05 | End: 2022-10-27 | Stop reason: SDUPTHER

## 2022-03-05 DIAGNOSIS — F41.1 GENERALIZED ANXIETY DISORDER: ICD-10-CM

## 2022-03-05 DIAGNOSIS — F32.A MILD DEPRESSIVE DISORDER: ICD-10-CM

## 2022-03-05 RX ORDER — BUPROPION HYDROCHLORIDE 300 MG/1
TABLET ORAL
Qty: 30 TABLET | Refills: 0 | Status: SHIPPED | OUTPATIENT
Start: 2022-03-05 | End: 2022-03-07 | Stop reason: SDUPTHER

## 2022-03-07 ENCOUNTER — OFFICE VISIT (OUTPATIENT)
Dept: INTERNAL MEDICINE | Facility: CLINIC | Age: 27
End: 2022-03-07

## 2022-03-07 VITALS
BODY MASS INDEX: 36.91 KG/M2 | SYSTOLIC BLOOD PRESSURE: 126 MMHG | WEIGHT: 188 LBS | TEMPERATURE: 98.1 F | DIASTOLIC BLOOD PRESSURE: 78 MMHG | HEIGHT: 60 IN | HEART RATE: 106 BPM | OXYGEN SATURATION: 99 %

## 2022-03-07 DIAGNOSIS — Z13.228 SCREENING FOR ENDOCRINE, METABOLIC AND IMMUNITY DISORDER: ICD-10-CM

## 2022-03-07 DIAGNOSIS — F41.1 GENERALIZED ANXIETY DISORDER: ICD-10-CM

## 2022-03-07 DIAGNOSIS — Z13.0 SCREENING FOR ENDOCRINE, METABOLIC AND IMMUNITY DISORDER: ICD-10-CM

## 2022-03-07 DIAGNOSIS — F32.A MILD DEPRESSIVE DISORDER: Primary | ICD-10-CM

## 2022-03-07 DIAGNOSIS — Z13.29 SCREENING FOR ENDOCRINE, METABOLIC AND IMMUNITY DISORDER: ICD-10-CM

## 2022-03-07 DIAGNOSIS — Z13.0 SCREENING FOR DISORDER OF BLOOD AND BLOOD-FORMING ORGANS: ICD-10-CM

## 2022-03-07 PROCEDURE — 99214 OFFICE O/P EST MOD 30 MIN: CPT | Performed by: NURSE PRACTITIONER

## 2022-03-07 RX ORDER — BUPROPION HYDROCHLORIDE 300 MG/1
300 TABLET ORAL DAILY
Qty: 90 TABLET | Refills: 1 | Status: SHIPPED | OUTPATIENT
Start: 2022-03-07 | End: 2022-04-06 | Stop reason: SDUPTHER

## 2022-03-07 NOTE — PROGRESS NOTES
"     Office Visit      Patient Name: Chloe Stovall  : 1995   MRN: [id number]     Chief Complaint:    Chief Complaint   Patient presents with   • Follow-up     Depression and anxiety       History of Present Illness: Chloe Stovall is a 26 y.o. female who is here today for follow-up of depression and anxiety has been taking Wellbutrin 300 mg since .  Has been prescribed BuSpar 10 mg 3 times daily as needed anxiety in the past.  Denies depressed mood, anhedonia, insomnia, hypersomnia, fatigue, feelings of worthlessness, difficulty concentrating, impaired memory, SI, HI, panic attacks, weight change.  Would like to have labs drawn today.     Subjective      I have reviewed and the following portions of the patient's history were updated as appropriate: past family history, past medical history, past social history, past surgical history and problem list.      Current Outpatient Medications:   •  buPROPion XL (WELLBUTRIN XL) 300 MG 24 hr tablet, Take 1 tablet by mouth Daily., Disp: 90 tablet, Rfl: 1  •  busPIRone (BUSPAR) 10 MG tablet, Take 1 tablet by mouth 3 (Three) Times a Day As Needed (anxiety)., Disp: 90 tablet, Rfl: 5  •  Etonogestrel (Nexplanon) 68 MG implant subdermal implant, , Disp: , Rfl:   •  loratadine (CLARITIN) 10 MG tablet, TAKE ONE TABLET BY MOUTH DAILY, Disp: 30 tablet, Rfl: 11  •  montelukast (SINGULAIR) 10 MG tablet, TAKE ONE TABLET BY MOUTH ONCE NIGHTLY, Disp: 30 tablet, Rfl: 11    No Known Allergies    Objective     Physical Exam:  Vital Signs:   Vitals:    22 1623   BP: 126/78   Pulse: 106   Temp: 98.1 °F (36.7 °C)   SpO2: 99%   Weight: 85.3 kg (188 lb)   Height: 152.4 cm (60\")     Body mass index is 36.72 kg/m².    Physical Exam  Constitutional:       Appearance: She is not ill-appearing.   HENT:      Head: Normocephalic.      Right Ear: External ear normal.      Left Ear: External ear normal.   Eyes:      Conjunctiva/sclera: Conjunctivae normal.      Pupils: Pupils are " equal, round, and reactive to light.   Neck:      Thyroid: No thyroid mass, thyromegaly or thyroid tenderness.   Cardiovascular:      Rate and Rhythm: Normal rate and regular rhythm.      Pulses:           Radial pulses are 2+ on the right side and 2+ on the left side.        Dorsalis pedis pulses are 2+ on the right side and 2+ on the left side.      Heart sounds: Normal heart sounds.   Pulmonary:      Effort: Pulmonary effort is normal.      Breath sounds: Normal breath sounds.   Musculoskeletal:      Cervical back: Normal range of motion and neck supple.      Right lower leg: No edema.      Left lower leg: No edema.   Skin:     General: Skin is warm.      Capillary Refill: Capillary refill takes less than 2 seconds.   Neurological:      Mental Status: She is alert and oriented to person, place, and time.      Coordination: Coordination normal.      Gait: Gait normal.   Psychiatric:         Mood and Affect: Mood normal.         Behavior: Behavior normal.         Thought Content: Thought content normal.         Assessment / Plan      Assessment/Plan:   Diagnoses and all orders for this visit:    1. Mild depressive disorder (HCC) (Primary)  -     buPROPion XL (WELLBUTRIN XL) 300 MG 24 hr tablet; Take 1 tablet by mouth Daily.  Dispense: 90 tablet; Refill: 1  -     Ambulatory Referral to Behavioral Health        - Encouraged to take part in daily physical exercise.          - Eat healthy, well balanced diet; avoid sugary foods or beverages        - Ensure good night's sleep by creating calm space in bedroom, avoiding screen time 1-2 hours before bed, no caffeine after 5 pm        - Talk to supportive family and friends, as needed    2. Generalized anxiety disorder  -     buPROPion XL (WELLBUTRIN XL) 300 MG 24 hr tablet; Take 1 tablet by mouth Daily.  Dispense: 90 tablet; Refill: 1  -     Ambulatory Referral to Behavioral Health  -     TSH  -     Thyroid Peroxidase Antibody  -     T4, Free    3. Screening for disorder  of blood and blood-forming organs  -     CBC & Differential    4. Screening for endocrine, metabolic and immunity disorder  -     TSH  -     Thyroid Peroxidase Antibody  -     T4, Free  -     Comprehensive Metabolic Panel    Follow Up:   Return if symptoms worsen or fail to improve.    Patient was given instructions and counseling regarding her condition or for health maintenance advice. Please see specific information pulled into the AVS if appropriate.       [facility] [address] [facility]     Please note that portions of this note may have been completed with a voice recognition program. Efforts were made to edit dictation, but occasionally words are mistranscribed.

## 2022-03-08 LAB
ALBUMIN SERPL-MCNC: 4.7 G/DL (ref 3.9–5)
ALBUMIN/GLOB SERPL: 1.6 {RATIO} (ref 1.2–2.2)
ALP SERPL-CCNC: 107 IU/L (ref 44–121)
ALT SERPL-CCNC: 15 IU/L (ref 0–32)
AST SERPL-CCNC: 15 IU/L (ref 0–40)
BASOPHILS # BLD AUTO: 0.1 X10E3/UL (ref 0–0.2)
BASOPHILS NFR BLD AUTO: 1 %
BILIRUB SERPL-MCNC: <0.2 MG/DL (ref 0–1.2)
BUN SERPL-MCNC: 12 MG/DL (ref 6–20)
BUN/CREAT SERPL: 16 (ref 9–23)
CALCIUM SERPL-MCNC: 10 MG/DL (ref 8.7–10.2)
CHLORIDE SERPL-SCNC: 105 MMOL/L (ref 96–106)
CO2 SERPL-SCNC: 20 MMOL/L (ref 20–29)
CREAT SERPL-MCNC: 0.74 MG/DL (ref 0.57–1)
EGFR GENE MUT ANL BLD/T: 116 ML/MIN/1.73
EOSINOPHIL # BLD AUTO: 0.1 X10E3/UL (ref 0–0.4)
EOSINOPHIL NFR BLD AUTO: 1 %
ERYTHROCYTE [DISTWIDTH] IN BLOOD BY AUTOMATED COUNT: 11.7 % (ref 11.7–15.4)
GLOBULIN SER CALC-MCNC: 2.9 G/DL (ref 1.5–4.5)
GLUCOSE SERPL-MCNC: 85 MG/DL (ref 65–99)
HCT VFR BLD AUTO: 41.8 % (ref 34–46.6)
HGB BLD-MCNC: 14.1 G/DL (ref 11.1–15.9)
IMM GRANULOCYTES # BLD AUTO: 0.1 X10E3/UL (ref 0–0.1)
IMM GRANULOCYTES NFR BLD AUTO: 1 %
LYMPHOCYTES # BLD AUTO: 3.2 X10E3/UL (ref 0.7–3.1)
LYMPHOCYTES NFR BLD AUTO: 30 %
MCH RBC QN AUTO: 30.6 PG (ref 26.6–33)
MCHC RBC AUTO-ENTMCNC: 33.7 G/DL (ref 31.5–35.7)
MCV RBC AUTO: 91 FL (ref 79–97)
MONOCYTES # BLD AUTO: 0.8 X10E3/UL (ref 0.1–0.9)
MONOCYTES NFR BLD AUTO: 7 %
NEUTROPHILS # BLD AUTO: 6.4 X10E3/UL (ref 1.4–7)
NEUTROPHILS NFR BLD AUTO: 60 %
PLATELET # BLD AUTO: 375 X10E3/UL (ref 150–450)
POTASSIUM SERPL-SCNC: 4.6 MMOL/L (ref 3.5–5.2)
PROT SERPL-MCNC: 7.6 G/DL (ref 6–8.5)
RBC # BLD AUTO: 4.61 X10E6/UL (ref 3.77–5.28)
SODIUM SERPL-SCNC: 142 MMOL/L (ref 134–144)
T4 FREE SERPL-MCNC: 1.31 NG/DL (ref 0.82–1.77)
THYROPEROXIDASE AB SERPL-ACNC: <8 IU/ML (ref 0–34)
TSH SERPL DL<=0.005 MIU/L-ACNC: 2.12 UIU/ML (ref 0.45–4.5)
WBC # BLD AUTO: 10.6 X10E3/UL (ref 3.4–10.8)

## 2022-04-06 DIAGNOSIS — F32.A MILD DEPRESSIVE DISORDER: ICD-10-CM

## 2022-04-06 DIAGNOSIS — F41.1 GENERALIZED ANXIETY DISORDER: ICD-10-CM

## 2022-04-06 RX ORDER — BUPROPION HYDROCHLORIDE 300 MG/1
300 TABLET ORAL DAILY
Qty: 90 TABLET | Refills: 1 | Status: SHIPPED | OUTPATIENT
Start: 2022-04-06 | End: 2022-09-26

## 2022-06-06 ENCOUNTER — TELEPHONE (OUTPATIENT)
Dept: OBSTETRICS AND GYNECOLOGY | Facility: CLINIC | Age: 27
End: 2022-06-06

## 2022-06-06 DIAGNOSIS — Z30.46 ENCOUNTER FOR SURVEILLANCE OF NEXPLANON SUBDERMAL CONTRACEPTIVE: Primary | ICD-10-CM

## 2022-06-06 NOTE — TELEPHONE ENCOUNTER
----- Message from Gudelia Garcia sent at 6/6/2022  1:13 PM EDT -----  Patient called and it is time for for her nexplanon replacement. Is it ok to go ahead and order a new one? Last pap 9/21/21    Flori

## 2022-09-25 DIAGNOSIS — F41.1 GENERALIZED ANXIETY DISORDER: ICD-10-CM

## 2022-09-25 DIAGNOSIS — F32.A MILD DEPRESSIVE DISORDER: ICD-10-CM

## 2022-09-26 RX ORDER — BUPROPION HYDROCHLORIDE 300 MG/1
TABLET ORAL
Qty: 30 TABLET | Refills: 1 | Status: SHIPPED | OUTPATIENT
Start: 2022-09-26

## 2022-10-27 DIAGNOSIS — Z91.09 ENVIRONMENTAL ALLERGIES: ICD-10-CM

## 2022-10-27 RX ORDER — MONTELUKAST SODIUM 10 MG/1
10 TABLET ORAL NIGHTLY
Qty: 90 TABLET | Refills: 1 | Status: SHIPPED | OUTPATIENT
Start: 2022-10-27

## 2022-10-27 RX ORDER — LORATADINE 10 MG/1
10 TABLET ORAL DAILY
Qty: 90 TABLET | Refills: 1 | Status: SHIPPED | OUTPATIENT
Start: 2022-10-27